# Patient Record
Sex: FEMALE | Race: WHITE | Employment: FULL TIME | URBAN - METROPOLITAN AREA
[De-identification: names, ages, dates, MRNs, and addresses within clinical notes are randomized per-mention and may not be internally consistent; named-entity substitution may affect disease eponyms.]

---

## 2019-10-13 ENCOUNTER — HOSPITAL ENCOUNTER (EMERGENCY)
Facility: CLINIC | Age: 49
Discharge: HOME | End: 2019-10-13
Attending: EMERGENCY MEDICINE | Admitting: EMERGENCY MEDICINE
Payer: COMMERCIAL

## 2019-10-13 ENCOUNTER — APPOINTMENT (OUTPATIENT)
Dept: GENERAL RADIOLOGY | Facility: CLINIC | Age: 49
End: 2019-10-13
Attending: EMERGENCY MEDICINE
Payer: COMMERCIAL

## 2019-10-13 VITALS
TEMPERATURE: 97.8 F | BODY MASS INDEX: 30.31 KG/M2 | DIASTOLIC BLOOD PRESSURE: 81 MMHG | HEIGHT: 68 IN | RESPIRATION RATE: 17 BRPM | SYSTOLIC BLOOD PRESSURE: 118 MMHG | WEIGHT: 200 LBS | OXYGEN SATURATION: 98 % | HEART RATE: 56 BPM

## 2019-10-13 DIAGNOSIS — R07.9 CHEST PAIN, UNSPECIFIED TYPE: ICD-10-CM

## 2019-10-13 LAB
ANION GAP SERPL CALCULATED.3IONS-SCNC: 5 MMOL/L (ref 3–14)
BASOPHILS # BLD AUTO: 0 10E9/L (ref 0–0.2)
BASOPHILS NFR BLD AUTO: 0.8 %
BUN SERPL-MCNC: 14 MG/DL (ref 7–30)
CALCIUM SERPL-MCNC: 9.3 MG/DL (ref 8.5–10.1)
CHLORIDE SERPL-SCNC: 108 MMOL/L (ref 94–109)
CO2 SERPL-SCNC: 26 MMOL/L (ref 20–32)
CREAT SERPL-MCNC: 0.72 MG/DL (ref 0.52–1.04)
DIFFERENTIAL METHOD BLD: ABNORMAL
EOSINOPHIL # BLD AUTO: 0.1 10E9/L (ref 0–0.7)
EOSINOPHIL NFR BLD AUTO: 1.8 %
ERYTHROCYTE [DISTWIDTH] IN BLOOD BY AUTOMATED COUNT: 12.5 % (ref 10–15)
GFR SERPL CREATININE-BSD FRML MDRD: >90 ML/MIN/{1.73_M2}
GLUCOSE SERPL-MCNC: 105 MG/DL (ref 70–99)
HCT VFR BLD AUTO: 40 % (ref 35–47)
HGB BLD-MCNC: 13.7 G/DL (ref 11.7–15.7)
IMM GRANULOCYTES # BLD: 0 10E9/L (ref 0–0.4)
IMM GRANULOCYTES NFR BLD: 0.3 %
INTERPRETATION ECG - MUSE: NORMAL
LYMPHOCYTES # BLD AUTO: 1.2 10E9/L (ref 0.8–5.3)
LYMPHOCYTES NFR BLD AUTO: 30.6 %
MCH RBC QN AUTO: 31.5 PG (ref 26.5–33)
MCHC RBC AUTO-ENTMCNC: 34.3 G/DL (ref 31.5–36.5)
MCV RBC AUTO: 92 FL (ref 78–100)
MONOCYTES # BLD AUTO: 0.4 10E9/L (ref 0–1.3)
MONOCYTES NFR BLD AUTO: 10.6 %
NEUTROPHILS # BLD AUTO: 2.2 10E9/L (ref 1.6–8.3)
NEUTROPHILS NFR BLD AUTO: 55.9 %
NRBC # BLD AUTO: 0 10*3/UL
NRBC BLD AUTO-RTO: 0 /100
PLATELET # BLD AUTO: 230 10E9/L (ref 150–450)
POTASSIUM SERPL-SCNC: 3.8 MMOL/L (ref 3.4–5.3)
RBC # BLD AUTO: 4.35 10E12/L (ref 3.8–5.2)
SODIUM SERPL-SCNC: 139 MMOL/L (ref 133–144)
TROPONIN I SERPL-MCNC: <0.015 UG/L (ref 0–0.04)
TROPONIN I SERPL-MCNC: <0.015 UG/L (ref 0–0.04)
WBC # BLD AUTO: 3.9 10E9/L (ref 4–11)

## 2019-10-13 PROCEDURE — 84484 ASSAY OF TROPONIN QUANT: CPT | Performed by: EMERGENCY MEDICINE

## 2019-10-13 PROCEDURE — 99285 EMERGENCY DEPT VISIT HI MDM: CPT | Mod: 25

## 2019-10-13 PROCEDURE — 93005 ELECTROCARDIOGRAM TRACING: CPT

## 2019-10-13 PROCEDURE — 80048 BASIC METABOLIC PNL TOTAL CA: CPT | Performed by: EMERGENCY MEDICINE

## 2019-10-13 PROCEDURE — 71046 X-RAY EXAM CHEST 2 VIEWS: CPT

## 2019-10-13 PROCEDURE — 85025 COMPLETE CBC W/AUTO DIFF WBC: CPT | Performed by: EMERGENCY MEDICINE

## 2019-10-13 ASSESSMENT — MIFFLIN-ST. JEOR: SCORE: 1580.69

## 2019-10-13 ASSESSMENT — ENCOUNTER SYMPTOMS
NAUSEA: 1
FEVER: 0
DIZZINESS: 1
COUGH: 0
MYALGIAS: 1
SHORTNESS OF BREATH: 0
LIGHT-HEADEDNESS: 1

## 2019-10-13 NOTE — ED PROVIDER NOTES
"  History     Chief Complaint:    Chest Pain     The history is provided by the patient and the spouse.      Shavonne Devries is a 49 year old female who presents with chest pain that radiates into her left arm. The pain began more as a discomfort this morning at 0630. The pain subsided and became more epigastric, which persisted while on the light rail this morning. While on the light rail, the patient felt dizzy, lightheaded, and nauseous. The patient currently feels \"okay\" and denies any of those previous symptoms in addition to shortness of breath. The patient also denies any cough, fevers, leg pain, or leg swelling. She denies any past personal history of cardiac problems. She also denies any family history of myocardial infarctions under the age of 55. The patient is from [place] and drove here yesterday; she reports 2.5-3 hour car trips each weekend to see her son play in band. Lastly, the patient took a baby aspirin at 0700 this morning.     Allergies:  No known drug allergies     Medications:    Aspirin 81 mg    Past Medical History:    The patient denies any significant past medical history.    Past Surgical History:    The patient does not have any pertinent past surgical history.    Family History:    No past pertinent family history.    Social History:  Negative for tobacco use.  Positive for alcohol use.   Negative for drug use.  Patient accompanied to the ER by her .  Marital Status:   [2]    Review of Systems   Constitutional: Negative for fever.   Respiratory: Negative for cough and shortness of breath.    Cardiovascular: Positive for chest pain. Negative for leg swelling.   Gastrointestinal: Positive for nausea.   Musculoskeletal: Positive for myalgias (reports left arm, denies leg pain).   Neurological: Positive for dizziness and light-headedness.   All other systems reviewed and are negative.      Physical Exam     Patient Vitals for the past 24 hrs:   BP Temp Temp src Pulse Heart " "Rate Resp SpO2 Height Weight   10/13/19 1100 121/80 -- -- 55 60 23 99 % -- --   10/13/19 1030 -- -- -- -- 56 12 -- -- --   10/13/19 0938 (!) 143/96 97.8  F (36.6  C) Oral 74 -- 16 100 % 1.727 m (5' 8\") 90.7 kg (200 lb)     Physical Exam  General/Appearance: appears stated age, well-groomed, appears comfortable  Eyes: EOMI, no scleral injection, no icterus  ENT: MMM  Neck: supple, nl ROM, no stiffness  Cardiovascular: RRR, nl S1S2, no m/r/g, 2+ pulses in all 4 extremities, cap refill <2sec  Respiratory: CTAB, good air movement throughout, no wheezes/rhonchi/rales, no increased WOB, no retractions  Back: no lesions  GI: abd soft, non-distended, nttp,  no HSM, no rebound, no guarding, nl BS  MSK: PINEDA, good tone, no bony abnormality  Skin: warm and well-perfused, no rash, no edema, no ecchymosis, nl turgor  Neuro: GCS 15, alert and oriented, no gross focal neuro deficits  Psych: interacts appropriately  Heme: no petechia, no purpura, no active bleeding    Emergency Department Course     ECG (9:46:11):  Rate 70 bpm. OH interval 102. QRS duration 94. QT/QTc 412/444. P-R-T axes 51 49 46 Sinus rhythm with short OH. Otherwise normal ECG. Interpreted at 1000 by Harriett Loja MD.    Imaging:  Radiology findings were communicated with the patient and family who voiced understanding of the findings.    XR  Chest 2 Views:   No acute cardiopulmonary disease. Small nodular opacity at  the left costophrenic angle is technically indeterminate. Suggest  follow-up chest x-ray in 3 months. If this does not resolve, suggest  CT chest, as per radiology.     Laboratory:  Laboratory findings were communicated with the patient and family who voiced understanding of the findings.    CBC: WBC 3.9 L o/w WNL. (HGB 13.7, )   BMP: Glucose 105 H o/w WNL (Creatinine 0.72)  Troponin I (1102): <0.015    Emergency Department Course:  Past medical records, nursing notes, and vitals reviewed.    0951: I performed an exam of the " patient as documented above.     EKG obtained in the ED, see results above.     IV was inserted and blood was drawn for laboratory testing, results above.    The patient was sent for a chest x-ray while in the emergency department, results above.     1112: Patient rechecked and updated.      I personally reviewed the laboratory, imaging, and EKG results with the Patient and spouse and answered all related questions prior to discharge.     Findings and plan explained to the Patient and spouse. Patient discharged home with instructions regarding supportive care, medications, and reasons to return. The importance of close follow-up was reviewed.     Impression & Plan     Medical Decision Making:  This pt presents with chest pain of unclear etiology.  At this time I do not suspect that there is an acute/dangerous pathology for the chest pain.  They have no significant personal/family history of cardiac ds.  They have no significant cardiac risk factors.  Their EKG and CXR were unremarkable.  Their delta trop was neg and I feel their risk is low-enough to not warrant delta trops.  I have also considered PE but they are PERC neg without SOB. There are no focal infiltrates, effusions, pulm edema, or evidence of PTX on CXR. The pt has not had recent fevers or viral infections to suggest pericarditis.  They are at low risk for aortic pathology and have nl aortic contour on CXR.  They have not had recent chest trauma.  All of this was discussed with the pt and they were reassurred.  I have advised them to follow-up with their PCP in the next 3 days to get further evaluation, and to return to the ED sooner if their CP continues/worsens, they develop severe SOB/fevers/lightheadedness, or they develop any other new and concerning sxs. At this point the pt is HD stable and appropriate for discharge.  I've recommended follow-up stress test with her PCP in [place].           Discharge Diagnosis:    ICD-10-CM   1. Chest pain,  unspecified type R07.9     Disposition:  Discharged to home.    Scribe Disclosure:  I, Ayah Talya, am serving as a scribe at 9:55 AM on 10/13/2019 to document services personally performed by Harriett Loja MD based on my observations and the provider's statements to me.     Ayah Babin  10/13/2019    EMERGENCY DEPARTMENT       Harriett Loja MD  10/13/19 1521       Harriett Loja MD  10/13/19 1521
